# Patient Record
Sex: MALE | URBAN - METROPOLITAN AREA
[De-identification: names, ages, dates, MRNs, and addresses within clinical notes are randomized per-mention and may not be internally consistent; named-entity substitution may affect disease eponyms.]

---

## 2018-03-30 ENCOUNTER — EMERGENCY (EMERGENCY)
Facility: HOSPITAL | Age: 38
LOS: 1 days | Discharge: HOME | End: 2018-03-30
Attending: EMERGENCY MEDICINE | Admitting: EMERGENCY MEDICINE
Payer: COMMERCIAL

## 2018-03-30 VITALS
SYSTOLIC BLOOD PRESSURE: 110 MMHG | TEMPERATURE: 99 F | WEIGHT: 199.96 LBS | HEART RATE: 90 BPM | DIASTOLIC BLOOD PRESSURE: 75 MMHG | RESPIRATION RATE: 18 BRPM | HEIGHT: 63 IN | OXYGEN SATURATION: 98 %

## 2018-03-30 VITALS
SYSTOLIC BLOOD PRESSURE: 145 MMHG | RESPIRATION RATE: 16 BRPM | OXYGEN SATURATION: 96 % | TEMPERATURE: 98 F | DIASTOLIC BLOOD PRESSURE: 81 MMHG | HEART RATE: 83 BPM

## 2018-03-30 PROCEDURE — 99283 EMERGENCY DEPT VISIT LOW MDM: CPT | Mod: 25

## 2018-03-30 PROCEDURE — 99284 EMERGENCY DEPT VISIT MOD MDM: CPT

## 2018-03-30 PROCEDURE — 96372 THER/PROPH/DIAG INJ SC/IM: CPT

## 2018-03-30 RX ORDER — IBUPROFEN 200 MG
1 TABLET ORAL
Qty: 28 | Refills: 0 | OUTPATIENT
Start: 2018-03-30 | End: 2018-04-05

## 2018-03-30 RX ORDER — AMOXICILLIN 250 MG/5ML
500 SUSPENSION, RECONSTITUTED, ORAL (ML) ORAL ONCE
Qty: 0 | Refills: 0 | Status: COMPLETED | OUTPATIENT
Start: 2018-03-30 | End: 2018-03-30

## 2018-03-30 RX ORDER — AMOXICILLIN 250 MG/5ML
1 SUSPENSION, RECONSTITUTED, ORAL (ML) ORAL
Qty: 21 | Refills: 0 | OUTPATIENT
Start: 2018-03-30 | End: 2018-04-05

## 2018-03-30 RX ORDER — KETOROLAC TROMETHAMINE 30 MG/ML
60 SYRINGE (ML) INJECTION ONCE
Qty: 0 | Refills: 0 | Status: DISCONTINUED | OUTPATIENT
Start: 2018-03-30 | End: 2018-03-30

## 2018-03-30 RX ADMIN — Medication 60 MILLIGRAM(S): at 22:10

## 2018-03-30 RX ADMIN — Medication 500 MILLIGRAM(S): at 22:17

## 2018-03-30 NOTE — ED PROVIDER NOTE - ATTENDING CONTRIBUTION TO CARE
hx as per pa and pt, 37 year old man with toothache for 6 days, no fever, drooling or change in voice  exam:_dental decay, no fluctuance, no abscess  plan:abx and pain meds, dental follow up  agree with assessment and plan of PA

## 2018-03-30 NOTE — ED PROVIDER NOTE - MEDICAL DECISION MAKING DETAILS
38 yo m with L upper and R lower posterior tooth pain x few days; no fever/abscess noted; will give nsaids for pain, abx, f/u dentist.

## 2018-03-30 NOTE — ED PROVIDER NOTE - OBJECTIVE STATEMENT
36 y/o M presents with c/o L upper tooth pain x 2 days. Pt states that he started having R lower posterior tooth pain a week ago, was feeling better and then started having L upper posterior tooth pain 2 days ago, worsening. States that he has been taking tylenol and using orajel without relief. Denies fever, n/v, difficulty breathing or swallowing, numbness, recent dental work, drooling or other symptoms.

## 2018-03-30 NOTE — ED PROVIDER NOTE - ENMT, MLM
+ttp and percussion of L upper posterior tooth and R lower posterior tooth with gross caries noted, no surrounding gingival erythema/swelling/discharge/fluctuance/abscess noted, +mild L facial swelling noted, no drooling/stridor, speaking clearly, swallowing without difficulty, Airway patent, Nasal mucosa clear. Mouth with normal mucosa. Throat has no vesicles, no oropharyngeal exudates and uvula is midline.

## 2018-04-04 RX ORDER — PANTOPRAZOLE SODIUM 20 MG/1
0 TABLET, DELAYED RELEASE ORAL
Qty: 0 | Refills: 0 | COMMUNITY

## 2022-12-08 ENCOUNTER — NEW PATIENT EMERGENCY (OUTPATIENT)
Dept: URBAN - METROPOLITAN AREA SURGICAL CENTER 72 | Facility: SURGICAL CENTER | Age: 42
End: 2022-12-08

## 2022-12-08 DIAGNOSIS — S05.02XA: ICD-10-CM

## 2022-12-08 DIAGNOSIS — T15.12XA: ICD-10-CM

## 2022-12-08 PROCEDURE — 65205 REMOVE FOREIGN BODY FROM EYE: CPT

## 2022-12-08 PROCEDURE — 99203 OFFICE O/P NEW LOW 30 MIN: CPT

## 2022-12-08 ASSESSMENT — TONOMETRY
OD_IOP_MMHG: 16
OS_IOP_MMHG: 16

## 2022-12-08 ASSESSMENT — VISUAL ACUITY
OS_CC: 20/40
OD_CC: 20/20

## 2025-02-13 NOTE — ED ADULT TRIAGE NOTE - PAIN: PRESENCE, MLM
Patient Quality Outreach    Patient is due for the following:   Asthma  -  ACT needed  Physical Well Child Check    Action(s) Taken:       Type of outreach:    Sent mobiTerist message.    Questions for provider review:    None           Carissa Fair        complains of pain/discomfort

## (undated) RX ORDER — OFLOXACIN 3 MG/ML
1 SOLUTION OPHTHALMIC
Start: 2022-12-08